# Patient Record
Sex: FEMALE | Race: BLACK OR AFRICAN AMERICAN | URBAN - METROPOLITAN AREA
[De-identification: names, ages, dates, MRNs, and addresses within clinical notes are randomized per-mention and may not be internally consistent; named-entity substitution may affect disease eponyms.]

---

## 2024-07-01 ENCOUNTER — LAB OUTSIDE AN ENCOUNTER (OUTPATIENT)
Dept: URBAN - METROPOLITAN AREA CLINIC 115 | Facility: CLINIC | Age: 24
End: 2024-07-01

## 2024-07-01 ENCOUNTER — DASHBOARD ENCOUNTERS (OUTPATIENT)
Age: 24
End: 2024-07-01

## 2024-07-01 ENCOUNTER — OFFICE VISIT (OUTPATIENT)
Dept: URBAN - METROPOLITAN AREA CLINIC 115 | Facility: CLINIC | Age: 24
End: 2024-07-01
Payer: COMMERCIAL

## 2024-07-01 VITALS
SYSTOLIC BLOOD PRESSURE: 138 MMHG | HEART RATE: 106 BPM | BODY MASS INDEX: 29.98 KG/M2 | WEIGHT: 197.8 LBS | HEIGHT: 68 IN | TEMPERATURE: 99.2 F | DIASTOLIC BLOOD PRESSURE: 87 MMHG | RESPIRATION RATE: 15 BRPM

## 2024-07-01 DIAGNOSIS — D50.0 IRON DEFICIENCY ANEMIA DUE TO CHRONIC BLOOD LOSS: ICD-10-CM

## 2024-07-01 DIAGNOSIS — R19.4 CHANGE IN BOWEL HABITS: ICD-10-CM

## 2024-07-01 DIAGNOSIS — K59.01 SLOW TRANSIT CONSTIPATION: ICD-10-CM

## 2024-07-01 PROBLEM — 35298007: Status: ACTIVE | Noted: 2024-07-01

## 2024-07-01 PROBLEM — 724556004: Status: ACTIVE | Noted: 2024-07-01

## 2024-07-01 PROCEDURE — 99204 OFFICE O/P NEW MOD 45 MIN: CPT | Performed by: INTERNAL MEDICINE

## 2024-07-01 NOTE — PHYSICAL EXAM GASTROINTESTINAL
Abdomen , hypoactive bowel sounds however abdomen is soft, mildly TTP diffusely, nontender, nondistended , no guarding or rigidity , no masses palpable , Liver and Spleen , no hepatosplenomegaly

## 2024-07-01 NOTE — HPI-TODAY'S VISIT:
23 y/o black female presents for constipation. She reports for past 2 months her stool has been thinner caliber. Tried increasing fiber, used gorge seeds, had hard stool with balls of gorge seeds. Took magnesium citrate which resulted in watery stools. Went to urgent care.  Only 1 or 2 times per week BM.  Then had severe bloating, with distention.  Urgent care told her she had fecal impaction.  Told her to go to gastro. Hasn't tried enema. Now having watery stools.  She also endorses labs at PCP showing iron deficiency and vitamin D deficiency.  She reports this is chronic since adolescence. Denies extremely heavy menses.  Endorses fam hx (mother) with iron deficiency.

## 2024-07-09 ENCOUNTER — CLAIMS CREATED FROM THE CLAIM WINDOW (OUTPATIENT)
Dept: URBAN - METROPOLITAN AREA SURGERY CENTER 13 | Facility: SURGERY CENTER | Age: 24
End: 2024-07-09
Payer: COMMERCIAL

## 2024-07-09 ENCOUNTER — CLAIMS CREATED FROM THE CLAIM WINDOW (OUTPATIENT)
Dept: URBAN - METROPOLITAN AREA CLINIC 4 | Facility: CLINIC | Age: 24
End: 2024-07-09
Payer: COMMERCIAL

## 2024-07-09 DIAGNOSIS — K31.89 OTHER DISEASES OF STOMACH AND DUODENUM: ICD-10-CM

## 2024-07-09 DIAGNOSIS — K63.5 POLYP OF DESCENDING COLON, UNSPECIFIED TYPE: ICD-10-CM

## 2024-07-09 DIAGNOSIS — D50.9 ANEMIA, IRON DEFICIENCY: ICD-10-CM

## 2024-07-09 DIAGNOSIS — K64.8 OTHER HEMORRHOIDS: ICD-10-CM

## 2024-07-09 DIAGNOSIS — K63.5 BENIGN COLON POLYP: ICD-10-CM

## 2024-07-09 DIAGNOSIS — K51.40 INFLAMMATORY POLYPS OF COLON WITHOUT COMPLICATIONS: ICD-10-CM

## 2024-07-09 LAB
HEMATOCRIT: 40.7
HEMOGLOBIN: 12.9
IRON BIND.CAP.(TIBC): 386
IRON SATURATION: 19
IRON: 75
MCH: 27.6
MCHC: 31.7
MCV: 87.2
MPV: 11.8
PLATELET COUNT: 255
RDW: 12.6
RED BLOOD CELL COUNT: 4.67
TISSUE TRANSGLUTAMINASE AB, IGA: <1
TISSUE TRANSGLUTAMINASE AB, IGG: <1
TSH W/REFLEX TO FT4: 1.54
WHITE BLOOD CELL COUNT: 4.4

## 2024-07-09 PROCEDURE — 43239 EGD BIOPSY SINGLE/MULTIPLE: CPT | Performed by: INTERNAL MEDICINE

## 2024-07-09 PROCEDURE — 45385 COLONOSCOPY W/LESION REMOVAL: CPT | Performed by: INTERNAL MEDICINE

## 2024-07-09 PROCEDURE — 88305 TISSUE EXAM BY PATHOLOGIST: CPT | Performed by: PATHOLOGY

## 2024-07-09 PROCEDURE — 00813 ANES UPR LWR GI NDSC PX: CPT | Performed by: ANESTHESIOLOGY

## 2024-07-09 PROCEDURE — 00813 ANES UPR LWR GI NDSC PX: CPT | Performed by: NURSE ANESTHETIST, CERTIFIED REGISTERED

## 2024-07-24 ENCOUNTER — OFFICE VISIT (OUTPATIENT)
Dept: URBAN - METROPOLITAN AREA CLINIC 115 | Facility: CLINIC | Age: 24
End: 2024-07-24